# Patient Record
Sex: MALE | Race: WHITE | Employment: FULL TIME | URBAN - METROPOLITAN AREA
[De-identification: names, ages, dates, MRNs, and addresses within clinical notes are randomized per-mention and may not be internally consistent; named-entity substitution may affect disease eponyms.]

---

## 2022-06-11 ENCOUNTER — HOSPITAL ENCOUNTER (EMERGENCY)
Age: 42
Discharge: HOME | End: 2022-06-11
Attending: EMERGENCY MEDICINE
Payer: MEDICAID

## 2022-06-11 VITALS
WEIGHT: 165 LBS | HEIGHT: 68 IN | BODY MASS INDEX: 25.01 KG/M2 | OXYGEN SATURATION: 96 % | DIASTOLIC BLOOD PRESSURE: 90 MMHG | SYSTOLIC BLOOD PRESSURE: 120 MMHG | TEMPERATURE: 98.7 F | HEART RATE: 112 BPM | RESPIRATION RATE: 18 BRPM

## 2022-06-11 DIAGNOSIS — U07.1 COVID-19: Primary | ICD-10-CM

## 2022-06-11 PROCEDURE — 99283 EMERGENCY DEPT VISIT LOW MDM: CPT

## 2022-06-11 RX ORDER — IBUPROFEN 200 MG
600 TABLET ORAL
Qty: 30 TABLET | Refills: 0 | Status: SHIPPED | OUTPATIENT
Start: 2022-06-11

## 2022-06-11 RX ORDER — GUAIFENESIN DEXTROMETHORPHAN HYDROBROMIDE ORAL SOLUTION 10; 100 MG/5ML; MG/5ML
10 SOLUTION ORAL
Qty: 118 ML | Refills: 0 | Status: SHIPPED | OUTPATIENT
Start: 2022-06-11

## 2022-06-11 NOTE — ED TRIAGE NOTES
CC concern for covid, pt endorses body aches, chills, HA, and fatigue since Thursday. Pt took home covid test which resulted as positive. Pt denies cough, sore throat, fever SOB, CP, N/V/D, and abdominal pain.

## 2022-06-11 NOTE — Clinical Note
[facility] EMERGENCY DEPT  [address] [address] [phone]    Work/School Note    Date: 6/11/2022     To Whom It May concern:    Saintclair Oms was evaluated by the following provider(s):  Attending Provider: Erika Valdez DO  Nurse Practitioner: Kria Rebolledo NP.   David Bloch virus is suspected. Per the CDC guidelines we recommend home isolation until the following conditions are all met:    1. At least five days have passed since symptoms first appeared and/or had a close exposure,   2. After home isolation for five days, wearing a mask around others for the next five days,  3. At least 24 have passed since last fever without the use of fever-reducing medications and  4.  Symptoms (eg cough, shortness of breath) have improved      Sincerely,          Sabi Proctor NP

## 2022-06-12 NOTE — ED PROVIDER NOTES
EMERGENCY DEPARTMENT HISTORY AND PHYSICAL EXAM    Date: 6/11/2022  Patient Name: Elizabeth Mathis    History of Presenting Illness     Chief Complaint   Patient presents with    Concern For XIGPN-64 (Coronavirus)         History Provided By: Patient    Chief Complaint: headache  Duration: onset2 days ago  Timing:  Acute  Location: Frontal area headache  Quality: Aching  Severity: Mild  Modifying Factors: none  Associated Symptoms: Body aches chills fatigue      HPI: Elizabeth Mathis is a 40 y.o. male with a PMH of No significant past medical history who presents with headache acute onset 2 days ago. Patient states he has also had body aches and chills. Patient states he took a COVID test at home and it was positive. Patient denies cough wheezing fever shortness of breath. He denies nausea vomiting diarrhea. PCP: None    Current Outpatient Medications   Medication Sig Dispense Refill    ibuprofen (MOTRIN) 200 mg tablet Take 3 Tablets by mouth every six (6) hours as needed for Pain. 30 Tablet 0    guaiFENesin-dextromethorphan (Adult Robitussin Peak Cold DM)  mg/5 mL liqd Take 10 mL by mouth every four (4) hours as needed for Cough or Congestion. 118 mL 0    HYDROcodone-acetaminophen (NORCO) 5-325 mg per tablet Take 1 Tab by mouth every four (4) hours as needed for Pain. Max Daily Amount: 6 Tabs. 8 Tab 0       Past History     Past Medical History:  No past medical history on file. Past Surgical History:  No past surgical history on file. Family History:  No family history on file. Social History:  Social History     Tobacco Use    Smoking status: Current Every Day Smoker    Smokeless tobacco: Not on file   Substance Use Topics    Alcohol use: No    Drug use: Not on file       Allergies:  No Known Allergies      Review of Systems   Review of Systems   Constitutional: Positive for chills and fatigue. Negative for fever. HENT: Negative for congestion and sore throat.     Eyes: Negative for redness. Respiratory: Negative for cough, chest tightness and wheezing. Cardiovascular: Negative for chest pain. Gastrointestinal: Negative for abdominal pain. Genitourinary: Negative for dysuria. Musculoskeletal: Positive for arthralgias. Negative for back pain, myalgias, neck pain and neck stiffness. Skin: Negative for rash. Neurological: Positive for headaches. Negative for dizziness, syncope, weakness, light-headedness and numbness. Hematological: Negative for adenopathy. Psychiatric/Behavioral: Negative for agitation and behavioral problems. All other systems reviewed and are negative. Physical Exam     Vitals:    06/11/22 1928   BP: (!) 120/90   Pulse: (!) 112   Resp: 18   Temp: 98.7 °F (37.1 °C)   SpO2: 96%   Weight: 74.8 kg (165 lb)   Height: 5' 8\" (1.727 m)     Physical Exam  Vitals and nursing note reviewed. Constitutional:       Appearance: He is well-developed. HENT:      Head: Normocephalic and atraumatic. Right Ear: External ear normal.      Mouth/Throat:      Pharynx: Posterior oropharyngeal erythema present. Eyes:      General:         Right eye: No discharge. Left eye: No discharge. Conjunctiva/sclera: Conjunctivae normal.   Cardiovascular:      Rate and Rhythm: Normal rate and regular rhythm. Heart sounds: Normal heart sounds. Pulmonary:      Effort: Pulmonary effort is normal. No respiratory distress. Breath sounds: Normal breath sounds. No wheezing. Abdominal:      General: Bowel sounds are normal.      Palpations: Abdomen is soft. Tenderness: There is no abdominal tenderness. Musculoskeletal:         General: Normal range of motion. Cervical back: Normal range of motion and neck supple. Lymphadenopathy:      Cervical: No cervical adenopathy. Skin:     General: Skin is warm and dry. Neurological:      Mental Status: He is alert and oriented to person, place, and time. Cranial Nerves: No cranial nerve deficit. Psychiatric:         Behavior: Behavior normal.         Thought Content: Thought content normal.         Judgment: Judgment normal.           Diagnostic Study Results     Labs -   No results found for this or any previous visit (from the past 12 hour(s)). Radiologic Studies -   No orders to display     CT Results  (Last 48 hours)    None        CXR Results  (Last 48 hours)    None            Medical Decision Making   I am the first provider for this patient. I reviewed the vital signs, available nursing notes, past medical history, past surgical history, family history and social history. Vital Signs-Reviewed the patient's vital signs. Records Reviewed: Nursing Notes    Provider Notes (Medical Decision Making):   DDX  Covid 19 influenza viral illness          Disposition:  home    DISCHARGE NOTE:     I have discussed with patient their diagnosis, treatment, and follow up plan. The patient agrees to follow up as outlined in discharge paperwork and also to return to the ED with any worsening. Seema Vasquez NP        Follow-up Information     Follow up With Specialties Details Why [address] Internal Medicine In 1 week  [address]  [phone]          Current Discharge Medication List      START taking these medications    Details   ibuprofen (MOTRIN) 200 mg tablet Take 3 Tablets by mouth every six (6) hours as needed for Pain. Qty: 30 Tablet, Refills: 0  Start date: 6/11/2022      guaiFENesin-dextromethorphan (Adult Robitussin Peak Cold DM)  mg/5 mL liqd Take 10 mL by mouth every four (4) hours as needed for Cough or Congestion. Qty: 118 mL, Refills: 0  Start date: 6/11/2022             Procedures:  Procedures    Please note that this dictation was completed with Dragon, computer voice recognition software.   Quite often unanticipated grammatical, syntax, homophones, and other interpretive errors are inadvertently transcribed by the computer software. Please disregard these errors. Additionally, please excuse any errors that have escaped final proofreading. Diagnosis     Clinical Impression:   1.  COVID-19

## 2022-06-12 NOTE — ED NOTES
Discharge instructions were given to the patient by Manju Pagan NP. The patient left the Emergency Department ambulatory, alert and oriented and in no acute distress with 2 prescriptions. The patient was encouraged to call or return to the ED for worsening issues or problems and was encouraged to schedule a follow up appointment for continuing care. The patient verbalized understanding of discharge instructions and prescriptions, all questions were answered. The patient has no further concerns at this time.

## 2022-06-13 ENCOUNTER — PATIENT OUTREACH (OUTPATIENT)
Dept: CASE MANAGEMENT | Age: 42
End: 2022-06-13